# Patient Record
Sex: MALE | Race: WHITE
[De-identification: names, ages, dates, MRNs, and addresses within clinical notes are randomized per-mention and may not be internally consistent; named-entity substitution may affect disease eponyms.]

---

## 2018-06-30 ENCOUNTER — HOSPITAL ENCOUNTER (EMERGENCY)
Dept: HOSPITAL 80 - FED | Age: 58
Discharge: HOME | End: 2018-06-30
Payer: COMMERCIAL

## 2018-06-30 VITALS — DIASTOLIC BLOOD PRESSURE: 70 MMHG | SYSTOLIC BLOOD PRESSURE: 110 MMHG

## 2018-06-30 DIAGNOSIS — N20.1: Primary | ICD-10-CM

## 2018-06-30 DIAGNOSIS — E86.9: ICD-10-CM

## 2018-06-30 LAB — PLATELET # BLD: 341 10^3/UL (ref 150–400)

## 2018-06-30 NOTE — EDPHY
H & P


Stated Complaint: right side/abd pain


Time Seen by Provider: 06/30/18 01:08


HPI/ROS: 





HPI


The patient presents with right flank pain which began at 12:10 a.m. And awoke 

him from sleep.  The pain is dull, constant, does not radiate, is severe.  Is 

associated with nausea.  He does not have any dysuria or hematuria.  He has a 

history of ureterolithiasis several years ago.  His stones have passed 

spontaneously.  He was feeling well when he went to bed last night.





REVIEW OF SYSTEMS


Constitutional:  No fever, no chills.


Eyes:  No discharge.


ENT:  No sore throat.


Cardiovascular:  No chest pain, no palpitations.


Respiratory:  No cough, no shortness of breath.


Gastrointestinal:  See HPI


Genitourinary:  No hematuria.


Musculoskeletal:  No back pain.


Skin:  No rashes.


Neurological:  No headache.





PMHx:  History of ureterolithiasis, prior orthopedic operations





Soc Hx:  Here with his partner








PHYSICAL


General Appearance: Alert, uncomfortable appearing


Eyes: Pupils equal and round no pallor or injection


ENT, Mouth: Mucous membranes moist


Respiratory: There are no retractions, lungs are clear to auscultation


Cardiovascular:  Regular rate and rhythm 


Gastrointestinal:  Abdomen is soft and non-tender, no masses, bowel sounds 

normal 


Back:  There is right flank tenderness


Neurological:  A&O, moves all extremities


Skin:  Warm and dry, no rashes


Musculoskeletal: Neck is supple non tender 


Extremities:  symmetrical, full range of motion 


Psychiatric:  Patient is oriented X 3, there is no agitation 





Source: Patient


Exam Limitations: No limitations





- Personal History


Current Tetanus/Diphtheria Vaccine: Yes


Current Tetanus Diphtheria and Acellular Pertussis (TDAP): Yes





- Medical/Surgical History


Hx Asthma: No


Hx Chronic Respiratory Disease: No


Hx Diabetes: No


Hx Cardiac Disease: No


Hx Renal Disease: No


Hx Cirrhosis: No


Hx Alcoholism: No


Hx HIV/AIDS: No


Hx Splenectomy or Spleen Trauma: No


Other PMH: left elbow surgery, ankle and shoulder surgery





- Social History


Smoking Status: Never smoked


Constitutional: 


 Initial Vital Signs











Temperature (C)  36.3 C   06/30/18 00:56


 


Heart Rate  59 L  06/30/18 00:56


 


Respiratory Rate  16   06/30/18 00:56


 


Blood Pressure  117/79   06/30/18 00:56


 


O2 Sat (%)  98   06/30/18 00:56








 











O2 Delivery Mode               Room Air














Allergies/Adverse Reactions: 


 





No Known Allergies Allergy (Verified 06/30/18 00:59)


 








Home Medications: 














 Medication  Instructions  Recorded


 


Omeprazole [Prilosec] 0 mg PO 04/24/16














Medical Decision Making





- Diagnostics


Imaging Results: 


Bedside renal Ultrasound- performed and interpreted by me.


Indication:  Right-sided flank pain


Findings:  Mild right-sided hydronephrosis, small nephrolithiasis seen, no free 

fluid in Morison's pouch


Impression:  Mild right-sided hydronephrosis


Differential Diagnosis: 





This is a 58-year-old man with prior history of ureterolithiasis who presents 

with 1 hr of severe right-sided flank pain which awoke him from sleep.  This is 

associated with nausea.





On exam, vital signs are normal, he is uncomfortable appearing, he has right 

flank tenderness.





Differential diagnosis includes ureterolithiasis, pyelonephritis, less likely 

AAA.





In the emergency department, IV was established and the patient was given 2 L 

of fluid.  He was given pain medication and antiemetics.





He felt much better after a single dose of Toradol.  He required 4 L of fluid 

IV before he could provide a urine sample.  This did demonstrate hematuria 

without any signs of infection.  Bedside ultrasound showed hydronephrosis on 

the right.  I suspect he is suffering from ureterolithiasis.  He has history of 

prior kidney stones which he has passed all spontaneously.  I do not think he 

needs a CT scan at this time given this.  I have provided him with a strainer, 

medication to go home with and instructions for follow-up with Urology if 

symptoms continue.  He is feeling well enough to go home.





- Data Points


Laboratory Results: 


 Laboratory Results





 06/30/18 01:00 





 06/30/18 01:00 





 











  06/30/18 06/30/18 06/30/18





  04:35 01:00 01:00


 


WBC      9.45 10^3/uL 10^3/uL





     (3.80-9.50) 


 


RBC      5.30 10^6/uL 10^6/uL





     (4.40-6.38) 


 


Hgb      15.2 g/dL g/dL





     (13.7-17.5) 


 


Hct      45.1 % %





     (40.0-51.0) 


 


MCV      85.1 fL fL





     (81.5-99.8) 


 


MCH      28.7 pg pg





     (27.9-34.1) 


 


MCHC      33.7 g/dL g/dL





     (32.4-36.7) 


 


RDW      14.4 % %





     (11.5-15.2) 


 


Plt Count      341 10^3/uL 10^3/uL





     (150-400) 


 


MPV      8.8 fL fL





     (8.7-11.7) 


 


Neut % (Auto)      46.0 % %





     (39.3-74.2) 


 


Lymph % (Auto)      44.1 % %





     (15.0-45.0) 


 


Mono % (Auto)      6.7 % %





     (4.5-13.0) 


 


Eos % (Auto)      2.3 % %





     (0.6-7.6) 


 


Baso % (Auto)      0.7 % %





     (0.3-1.7) 


 


Nucleat RBC Rel Count      0.0 % %





     (0.0-0.2) 


 


Absolute Neuts (auto)      4.34 10^3/uL 10^3/uL





     (1.70-6.50) 


 


Absolute Lymphs (auto)      4.17 10^3/uL H 10^3/uL





     (1.00-3.00) 


 


Absolute Monos (auto)      0.63 10^3/uL 10^3/uL





     (0.30-0.80) 


 


Absolute Eos (auto)      0.22 10^3/uL 10^3/uL





     (0.03-0.40) 


 


Absolute Basos (auto)      0.07 10^3/uL 10^3/uL





     (0.02-0.10) 


 


Absolute Nucleated RBC      0.00 10^3/uL 10^3/uL





     (0-0.01) 


 


Immature Gran %      0.2 % %





     (0.0-1.1) 


 


Immature Gran #      0.02 10^3/uL 10^3/uL





     (0.00-0.10) 


 


Sodium    141 mEq/L mEq/L  





    (135-145)  


 


Potassium    4.1 mEq/L mEq/L  





    (3.3-5.0)  


 


Chloride    105 mEq/L mEq/L  





    ()  


 


Carbon Dioxide    26 mEq/l mEq/l  





    (22-31)  


 


Anion Gap    10 mEq/L mEq/L  





    (8-16)  


 


BUN    23 mg/dL mg/dL  





    (7-23)  


 


Creatinine    1.0 mg/dL mg/dL  





    (0.7-1.3)  


 


Estimated GFR    > 60   





    


 


Glucose    95 mg/dL mg/dL  





    ()  


 


Calcium    9.7 mg/dL mg/dL  





    (8.5-10.4)  


 


Urine Color  YELLOW     





    


 


Urine Appearance  CLEAR     





    


 


Urine pH  5.0     





   (5.0-7.5)   


 


Ur Specific Gravity  1.019     





   (1.002-1.030)   


 


Urine Protein  NEGATIVE     





   (NEGATIVE)   


 


Urine Ketones  NEGATIVE     





   (NEGATIVE)   


 


Urine Blood  2+  H     





   (NEGATIVE)   


 


Urine Nitrate  NEGATIVE     





   (NEGATIVE)   


 


Urine Bilirubin  NEGATIVE     





   (NEGATIVE)   


 


Urine Urobilinogen  NEGATIVE EU EU    





   (0.2-1.0)   


 


Ur Leukocyte Esterase  NEGATIVE     





   (NEGATIVE)   


 


Urine RBC  15-25 /hpf H /hpf    





   (0-3)   


 


Urine WBC  1-3 /hpf /hpf    





   (0-3)   


 


Ur Epithelial Cells  NONE SEEN /lpf /lpf    





   (NONE-1+)   


 


Urine Mucus  1+ /lpf /lpf    





   (NONE-1+)   


 


Urine Glucose  NEGATIVE     





   (NEGATIVE)   











Medications Given: 


 








Discontinued Medications





Sodium Chloride (Ns)  1,000 mls @ 0 mls/hr IV ONCE ONE; Wide Open


   PRN Reason: Protocol


   Stop: 06/30/18 01:10


   Last Admin: 06/30/18 01:22 Dose:  1,000 mls


Sodium Chloride (Ns)  1,000 mls @ 0 mls/hr IV ONCE ONE; Wide Open


   PRN Reason: Protocol


   Stop: 06/30/18 01:10


   Last Admin: 06/30/18 01:22 Dose:  1,000 mls


Sodium Chloride (Ns)  1,000 mls @ 0 mls/hr IV ONCE ONE


   PRN Reason: Wide Open


   Stop: 06/30/18 02:46


   Last Admin: 06/30/18 02:49 Dose:  1,000 mls


Sodium Chloride (Ns)  1,000 mls @ 3,000 mls/hr IV ONCE ONE


   Stop: 06/30/18 04:19


   Last Admin: 06/30/18 04:02 Dose:  1,000 mls


Ketorolac Tromethamine (Toradol)  15 mg IVP EDNOW ONE


   Stop: 06/30/18 01:10


   Last Admin: 06/30/18 01:22 Dose:  15 mg


Ondansetron HCl (Zofran)  4 mg IVP EDNOW ONE


   Stop: 06/30/18 01:10


   Last Admin: 06/30/18 01:22 Dose:  4 mg


Ondansetron HCl (Zofran Odt 4 Mg Prepack#2)  1 btl TAKEHOME EDNOW ONE


   Stop: 06/30/18 05:03


   Last Admin: 06/30/18 05:22 Dose:  1 btl


Oxycodone/Acetaminophen (Percocet 5/325mg Prepack#4)  1 btl TAKEHOME EDNOW ONE


   Stop: 06/30/18 05:03


   Last Admin: 06/30/18 05:22 Dose:  1 btl








Departure





- Departure


Disposition: Home, Routine, Self-Care


Clinical Impression: 


 Ureterolithiasis





Condition: Good


Instructions:  Oxycodone/Acetaminophen (By mouth), Ondansetron (By mouth), 

Renal Colic (ED)


Additional Instructions: 


1.  Take Ibuprofen or Motrin 600 mg by mouth three times a day.


2.  Percocet as needed for severe pain


3.  Zofran as needed for nausea


4.  Strain urine as directed


5.  Return to the Emergency Department for intractable pain, fever or vomiting.


6.  Followup with the urologist you have been referred to for unimproved 

symptoms.


Referrals: 


Ian Wise MD [Primary Care Provider] - As per Instructions


Osbaldo Alcaraz MD [Medical Doctor] - As per Instructions